# Patient Record
Sex: FEMALE | Race: WHITE | HISPANIC OR LATINO | URBAN - METROPOLITAN AREA
[De-identification: names, ages, dates, MRNs, and addresses within clinical notes are randomized per-mention and may not be internally consistent; named-entity substitution may affect disease eponyms.]

---

## 2019-05-10 ENCOUNTER — APPOINTMENT (OUTPATIENT)
Age: 59
Setting detail: DERMATOLOGY
End: 2019-05-10

## 2019-05-10 ENCOUNTER — APPOINTMENT (RX ONLY)
Age: 59
Setting detail: DERMATOLOGY
End: 2019-05-10

## 2019-05-10 VITALS
DIASTOLIC BLOOD PRESSURE: 74 MMHG | SYSTOLIC BLOOD PRESSURE: 134 MMHG | DIASTOLIC BLOOD PRESSURE: 74 MMHG | SYSTOLIC BLOOD PRESSURE: 134 MMHG

## 2019-05-10 DIAGNOSIS — L21.8 OTHER SEBORRHEIC DERMATITIS: ICD-10-CM

## 2019-05-10 DIAGNOSIS — L71.8 OTHER ROSACEA: ICD-10-CM

## 2019-05-10 DIAGNOSIS — L29.89 OTHER PRURITUS: ICD-10-CM

## 2019-05-10 PROBLEM — F41.9 ANXIETY DISORDER, UNSPECIFIED: Status: ACTIVE | Noted: 2019-05-10

## 2019-05-10 PROBLEM — L29.8 OTHER PRURITUS: Status: ACTIVE | Noted: 2019-05-10

## 2019-05-10 PROBLEM — E03.9 HYPOTHYROIDISM, UNSPECIFIED: Status: ACTIVE | Noted: 2019-05-10

## 2019-05-10 PROBLEM — J30.1 ALLERGIC RHINITIS DUE TO POLLEN: Status: ACTIVE | Noted: 2019-05-10

## 2019-05-10 PROBLEM — L20.84 INTRINSIC (ALLERGIC) ECZEMA: Status: ACTIVE | Noted: 2019-05-10

## 2019-05-10 PROBLEM — L40.0 PSORIASIS VULGARIS: Status: ACTIVE | Noted: 2019-05-10

## 2019-05-10 PROCEDURE — ? PRESCRIPTION

## 2019-05-10 PROCEDURE — ? COUNSELING

## 2019-05-10 PROCEDURE — 99201: CPT

## 2019-05-10 RX ORDER — KETOCONAZOLE 20.5 MG/ML
SHAMPOO, SUSPENSION TOPICAL QD
Qty: 1 | Refills: 4 | Status: ERX | COMMUNITY
Start: 2019-05-10

## 2019-05-10 RX ORDER — SULFACETAMIDE SODIUM, SULFUR 100; 50 MG/G; MG/G
CREAM TOPICAL QD
Qty: 1 | Refills: 3 | Status: ERX | COMMUNITY
Start: 2019-05-10

## 2019-05-10 RX ADMIN — SULFACETAMIDE SODIUM, SULFUR 1: 100; 50 CREAM TOPICAL at 00:00

## 2019-05-10 RX ADMIN — KETOCONAZOLE 1: 20.5 SHAMPOO, SUSPENSION TOPICAL at 00:00

## 2019-05-10 ASSESSMENT — LOCATION DETAILED DESCRIPTION DERM
LOCATION DETAILED: RIGHT SUPERIOR PARIETAL SCALP
LOCATION DETAILED: LEFT OCCIPITAL SCALP
LOCATION DETAILED: MID-OCCIPITAL SCALP
LOCATION DETAILED: LEFT OCCIPITAL SCALP
LOCATION DETAILED: LEFT MEDIAL FRONTAL SCALP
LOCATION DETAILED: LEFT MEDIAL FRONTAL SCALP
LOCATION DETAILED: MID-OCCIPITAL SCALP
LOCATION DETAILED: RIGHT SUPERIOR PARIETAL SCALP

## 2019-05-10 ASSESSMENT — LOCATION SIMPLE DESCRIPTION DERM
LOCATION SIMPLE: POSTERIOR SCALP
LOCATION SIMPLE: LEFT SCALP
LOCATION SIMPLE: SCALP
LOCATION SIMPLE: LEFT SCALP
LOCATION SIMPLE: POSTERIOR SCALP
LOCATION SIMPLE: SCALP

## 2019-05-10 ASSESSMENT — LOCATION ZONE DERM
LOCATION ZONE: SCALP
LOCATION ZONE: SCALP

## 2019-05-10 NOTE — PROCEDURE: MIPS QUALITY
Quality 47: Advance Care Plan: Advance Care Planning discussed and documented in the medical record; patient did not wish or was not able to name a surrogate decision maker or provide an advance care plan.
Quality 130: Documentation Of Current Medications In The Medical Record: Current Medications Documented
Quality 110: Preventive Care And Screening: Influenza Immunization: Influenza Immunization not Administered because Patient Refused.
Detail Level: Detailed
Quality 226: Preventive Care And Screening: Tobacco Use: Screening And Cessation Intervention: Patient screened for tobacco use, is a smoker AND received Cessation Counseling
Quality 431: Preventive Care And Screening: Unhealthy Alcohol Use - Screening: Patient screened for unhealthy alcohol use using a single question and scores less than 2 times per year

## 2019-05-10 NOTE — HPI: RASH (SEBORRHEIC DERMATITIS)
How Severe Is It?: moderate
Is This A New Presentation, Or A Follow-Up?: Rash
Additional History: She started on thyroid medication about 2 months ago.

## 2019-05-10 NOTE — PROCEDURE: MIPS QUALITY
Quality 110: Preventive Care And Screening: Influenza Immunization: Influenza Immunization not Administered because Patient Refused.
Quality 431: Preventive Care And Screening: Unhealthy Alcohol Use - Screening: Patient screened for unhealthy alcohol use using a single question and scores less than 2 times per year
Quality 226: Preventive Care And Screening: Tobacco Use: Screening And Cessation Intervention: Patient screened for tobacco use, is a smoker AND received Cessation Counseling
Detail Level: Detailed
Quality 47: Advance Care Plan: Advance Care Planning discussed and documented in the medical record; patient did not wish or was not able to name a surrogate decision maker or provide an advance care plan.
Quality 130: Documentation Of Current Medications In The Medical Record: Current Medications Documented

## 2019-06-26 ENCOUNTER — APPOINTMENT (OUTPATIENT)
Age: 59
Setting detail: DERMATOLOGY
End: 2019-06-26

## 2019-06-26 ENCOUNTER — APPOINTMENT (RX ONLY)
Age: 59
Setting detail: DERMATOLOGY
End: 2019-06-26

## 2019-06-26 VITALS
DIASTOLIC BLOOD PRESSURE: 80 MMHG | DIASTOLIC BLOOD PRESSURE: 80 MMHG | SYSTOLIC BLOOD PRESSURE: 139 MMHG | SYSTOLIC BLOOD PRESSURE: 139 MMHG

## 2019-06-26 DIAGNOSIS — L21.8 OTHER SEBORRHEIC DERMATITIS: ICD-10-CM | Status: WELL CONTROLLED

## 2019-06-26 DIAGNOSIS — L71.8 OTHER ROSACEA: ICD-10-CM | Status: WELL CONTROLLED

## 2019-06-26 PROCEDURE — ? PRESCRIPTION

## 2019-06-26 PROCEDURE — 99213 OFFICE O/P EST LOW 20 MIN: CPT

## 2019-06-26 PROCEDURE — ? COUNSELING

## 2019-06-26 PROCEDURE — ? TREATMENT REGIMEN

## 2019-06-26 RX ORDER — METRONIDAZOLE 10 MG/G
1 GEL TOPICAL QD
Qty: 1 | Refills: 3 | Status: ERX | COMMUNITY
Start: 2019-06-26

## 2019-06-26 RX ADMIN — METRONIDAZOLE 1: 10 GEL TOPICAL at 12:49

## 2019-06-26 ASSESSMENT — LOCATION DETAILED DESCRIPTION DERM
LOCATION DETAILED: LEFT INFERIOR MEDIAL MALAR CHEEK
LOCATION DETAILED: MID-OCCIPITAL SCALP
LOCATION DETAILED: LEFT INFERIOR MEDIAL MALAR CHEEK
LOCATION DETAILED: MID-OCCIPITAL SCALP

## 2019-06-26 ASSESSMENT — LOCATION ZONE DERM
LOCATION ZONE: FACE
LOCATION ZONE: SCALP
LOCATION ZONE: FACE
LOCATION ZONE: SCALP

## 2019-06-26 ASSESSMENT — LOCATION SIMPLE DESCRIPTION DERM
LOCATION SIMPLE: LEFT CHEEK
LOCATION SIMPLE: POSTERIOR SCALP
LOCATION SIMPLE: POSTERIOR SCALP
LOCATION SIMPLE: LEFT CHEEK

## 2019-06-26 NOTE — PROCEDURE: MIPS QUALITY
Quality 110: Preventive Care And Screening: Influenza Immunization: Influenza Immunization not Administered because Patient Refused.
Detail Level: Detailed
Quality 431: Preventive Care And Screening: Unhealthy Alcohol Use - Screening: Patient screened for unhealthy alcohol use using a single question and scores less than 2 times per year
Quality 47: Advance Care Plan: Advance Care Planning discussed and documented in the medical record; patient did not wish or was not able to name a surrogate decision maker or provide an advance care plan.
Quality 130: Documentation Of Current Medications In The Medical Record: Current Medications Documented
Quality 226: Preventive Care And Screening: Tobacco Use: Screening And Cessation Intervention: Patient screened for tobacco use, is a smoker AND received Cessation Counseling

## 2019-06-26 NOTE — PROCEDURE: MIPS QUALITY
Quality 110: Preventive Care And Screening: Influenza Immunization: Influenza Immunization not Administered because Patient Refused.
Quality 431: Preventive Care And Screening: Unhealthy Alcohol Use - Screening: Patient screened for unhealthy alcohol use using a single question and scores less than 2 times per year
Quality 226: Preventive Care And Screening: Tobacco Use: Screening And Cessation Intervention: Patient screened for tobacco use, is a smoker AND received Cessation Counseling
Quality 130: Documentation Of Current Medications In The Medical Record: Current Medications Documented
Detail Level: Detailed
Quality 47: Advance Care Plan: Advance Care Planning discussed and documented in the medical record; patient did not wish or was not able to name a surrogate decision maker or provide an advance care plan.